# Patient Record
Sex: MALE | Race: WHITE
[De-identification: names, ages, dates, MRNs, and addresses within clinical notes are randomized per-mention and may not be internally consistent; named-entity substitution may affect disease eponyms.]

---

## 2019-03-01 ENCOUNTER — HOSPITAL ENCOUNTER (OUTPATIENT)
Dept: HOSPITAL 59 - HOP | Age: 79
Discharge: HOME | End: 2019-03-01
Attending: INTERNAL MEDICINE
Payer: MEDICARE

## 2019-03-01 DIAGNOSIS — D12.0: ICD-10-CM

## 2019-03-01 DIAGNOSIS — I10: ICD-10-CM

## 2019-03-01 DIAGNOSIS — K62.89: ICD-10-CM

## 2019-03-01 DIAGNOSIS — D12.2: ICD-10-CM

## 2019-03-01 DIAGNOSIS — K92.1: Primary | ICD-10-CM

## 2019-03-01 DIAGNOSIS — D12.4: ICD-10-CM

## 2019-03-01 DIAGNOSIS — Z86.010: ICD-10-CM

## 2019-03-04 NOTE — OPERATIVE NOTE
DATE OF SURGERY: 03/01/2019



SURGEON: Latoya Melendez MD  



OPERATION: COLONOSCOPY. 



INDICATIONS: This is a 78-year-old male with history of colon polyps who had bloody stool and now 
presents for colonoscopy. 



POSTOPERATIVE DIAGNOSES:

1. Two 6-8 mm sessile polyps in the cecum that were removed by cold snare. 

2. Two 6-8 mm sessile polyps in the ascending colon that were removed by cold snare. 

3. Three 8 mm descending colon polyps that were also removed by cold snare. 

4. A 1.5 cm rectal mass that was lifted with ink spot and subsequently removed with snare cautery. 



ANESTHESIA: Sedation is per Anesthesia. Pulse oximetry was monitored throughout the procedure to 
maintain O2 saturation of 90% or greater. Supplemental oxygen was administered via nasal cannula. 
Cardiac and vital signs were monitored throughout the duration of the procedure, and they were 
stable. 



The procedure of colonoscopy and risks and alternatives of the procedure, including the risk of 
bleeding and perforation, among others, were explained to the patient who voiced understanding and 
agreed to have the procedure done. Physical examination was performed, and the patient was found 
stable for sedation. 



PROCEDURE: The patient was placed in the left lateral position. Sedation was initiated. A digital 
rectal exam was performed and showed some mild external hemorrhoids with palpable rectal nodule. An 
Olympus PCF-180AL colonoscope was then inserted into the rectum under direct visualization. It was 
advanced to the cecum without difficulty. The ileocecal valve and appendiceal orifice were 
identified and photographed. The colonic mucosa was carefully examined upon introduction of the 
colonoscope. In the cecum were two 6-8 mm sessile polyps that were noted and they were removed by 
cold snare. In the ascending colon were also two 6-8 mm sessile polyps that were noted and were 
removed by cold snare. The colonoscope was then withdrawn while carefully examining the colonic 
mucosal surfaces after the ileocecal valve was intubated and inspected for about 10 cm and it was 
normal. The rest of the cecum and ascending colon and transverse colon mucosa appeared normal. In 
the descending colon were three 8 mm sessile polyp that were noted and they were removed by cold 
snare. The rest of the descending and sigmoid colon mucosa appeared normal except for occasional 
diverticulosis. In the rectum, upon retroflexion was a 1.5 cm mass lesion that was noted. Using an 
injection needle, the lesion was lifted with ink and subsequently removed with snare cautery with no 
immediate complications. The colonoscope was then withdrawn and the procedure was terminated. The 
patient tolerated the procedure well without any immediate complications. The patient remained with 
stable vital signs and was transferred to the recovery room.



RECOMMENDATIONS: 

1. The patient should stay on a low-residue diet for 2 weeks and thereafter is to be on a high-fiber 
diet. 

2. The patient is to have a repeat colonoscopy as needed. 



Thank you for allowing me to participate in the care of your patient. CC: MD RACHID Linda